# Patient Record
Sex: MALE | Race: WHITE
[De-identification: names, ages, dates, MRNs, and addresses within clinical notes are randomized per-mention and may not be internally consistent; named-entity substitution may affect disease eponyms.]

---

## 2022-11-06 ENCOUNTER — HOSPITAL ENCOUNTER (EMERGENCY)
Dept: HOSPITAL 4 - SED | Age: 27
Discharge: HOME | End: 2022-11-06
Payer: COMMERCIAL

## 2022-11-06 VITALS — WEIGHT: 190 LBS | HEIGHT: 71 IN | BODY MASS INDEX: 26.6 KG/M2

## 2022-11-06 VITALS — SYSTOLIC BLOOD PRESSURE: 126 MMHG

## 2022-11-06 DIAGNOSIS — M79.641: Primary | ICD-10-CM

## 2022-11-06 DIAGNOSIS — R20.2: ICD-10-CM

## 2022-11-06 DIAGNOSIS — Z79.899: ICD-10-CM

## 2022-11-06 NOTE — NUR
velcro wrist splint applied to patient's right wrist. patients pms present 
before splint application and after splint application.